# Patient Record
Sex: FEMALE | Race: WHITE | NOT HISPANIC OR LATINO | URBAN - METROPOLITAN AREA
[De-identification: names, ages, dates, MRNs, and addresses within clinical notes are randomized per-mention and may not be internally consistent; named-entity substitution may affect disease eponyms.]

---

## 2017-10-27 ENCOUNTER — OUTPATIENT (OUTPATIENT)
Dept: OUTPATIENT SERVICES | Facility: HOSPITAL | Age: 67
LOS: 1 days | End: 2017-10-27
Payer: MEDICARE

## 2017-10-27 VITALS
HEIGHT: 64 IN | DIASTOLIC BLOOD PRESSURE: 76 MMHG | HEART RATE: 96 BPM | TEMPERATURE: 98 F | RESPIRATION RATE: 14 BRPM | WEIGHT: 201.06 LBS | SYSTOLIC BLOOD PRESSURE: 135 MMHG

## 2017-10-27 DIAGNOSIS — Z90.89 ACQUIRED ABSENCE OF OTHER ORGANS: Chronic | ICD-10-CM

## 2017-10-27 DIAGNOSIS — Z41.9 ENCOUNTER FOR PROCEDURE FOR PURPOSES OTHER THAN REMEDYING HEALTH STATE, UNSPECIFIED: Chronic | ICD-10-CM

## 2017-10-27 DIAGNOSIS — N20.0 CALCULUS OF KIDNEY: ICD-10-CM

## 2017-10-27 DIAGNOSIS — Z90.2 ACQUIRED ABSENCE OF LUNG [PART OF]: Chronic | ICD-10-CM

## 2017-10-27 DIAGNOSIS — Z98.890 OTHER SPECIFIED POSTPROCEDURAL STATES: Chronic | ICD-10-CM

## 2017-10-27 DIAGNOSIS — Z01.818 ENCOUNTER FOR OTHER PREPROCEDURAL EXAMINATION: ICD-10-CM

## 2017-10-27 LAB
ALBUMIN SERPL ELPH-MCNC: 3.3 G/DL — SIGNIFICANT CHANGE UP (ref 3.3–5)
ALP SERPL-CCNC: 90 U/L — SIGNIFICANT CHANGE UP (ref 40–120)
ALT FLD-CCNC: 27 U/L — SIGNIFICANT CHANGE UP (ref 12–78)
ANION GAP SERPL CALC-SCNC: 9 MMOL/L — SIGNIFICANT CHANGE UP (ref 5–17)
APPEARANCE UR: ABNORMAL
APTT BLD: 32.8 SEC — SIGNIFICANT CHANGE UP (ref 27.5–37.4)
AST SERPL-CCNC: 22 U/L — SIGNIFICANT CHANGE UP (ref 15–37)
BILIRUB SERPL-MCNC: 0.4 MG/DL — SIGNIFICANT CHANGE UP (ref 0.2–1.2)
BILIRUB UR-MCNC: NEGATIVE — SIGNIFICANT CHANGE UP
BUN SERPL-MCNC: 11 MG/DL — SIGNIFICANT CHANGE UP (ref 7–23)
CALCIUM SERPL-MCNC: 9.7 MG/DL — SIGNIFICANT CHANGE UP (ref 8.5–10.1)
CHLORIDE SERPL-SCNC: 103 MMOL/L — SIGNIFICANT CHANGE UP (ref 96–108)
CO2 SERPL-SCNC: 27 MMOL/L — SIGNIFICANT CHANGE UP (ref 22–31)
COLOR SPEC: YELLOW — SIGNIFICANT CHANGE UP
CREAT SERPL-MCNC: 0.86 MG/DL — SIGNIFICANT CHANGE UP (ref 0.5–1.3)
DIFF PNL FLD: ABNORMAL
GLUCOSE SERPL-MCNC: 91 MG/DL — SIGNIFICANT CHANGE UP (ref 70–99)
GLUCOSE UR QL: NEGATIVE — SIGNIFICANT CHANGE UP
HCT VFR BLD CALC: 44.3 % — SIGNIFICANT CHANGE UP (ref 34.5–45)
HGB BLD-MCNC: 14.7 G/DL — SIGNIFICANT CHANGE UP (ref 11.5–15.5)
INR BLD: 1.13 RATIO — SIGNIFICANT CHANGE UP (ref 0.88–1.16)
KETONES UR-MCNC: NEGATIVE — SIGNIFICANT CHANGE UP
LEUKOCYTE ESTERASE UR-ACNC: ABNORMAL
MCHC RBC-ENTMCNC: 31.4 PG — SIGNIFICANT CHANGE UP (ref 27–34)
MCHC RBC-ENTMCNC: 33.2 GM/DL — SIGNIFICANT CHANGE UP (ref 32–36)
MCV RBC AUTO: 94.5 FL — SIGNIFICANT CHANGE UP (ref 80–100)
NITRITE UR-MCNC: NEGATIVE — SIGNIFICANT CHANGE UP
PH UR: 6.5 — SIGNIFICANT CHANGE UP (ref 5–8)
PLATELET # BLD AUTO: 306 K/UL — SIGNIFICANT CHANGE UP (ref 150–400)
POTASSIUM SERPL-MCNC: 4.2 MMOL/L — SIGNIFICANT CHANGE UP (ref 3.5–5.3)
POTASSIUM SERPL-SCNC: 4.2 MMOL/L — SIGNIFICANT CHANGE UP (ref 3.5–5.3)
PROT SERPL-MCNC: 7.8 G/DL — SIGNIFICANT CHANGE UP (ref 6–8.3)
PROT UR-MCNC: 150 MG/DL
PROTHROM AB SERPL-ACNC: 12.4 SEC — SIGNIFICANT CHANGE UP (ref 9.8–12.7)
RBC # BLD: 4.68 M/UL — SIGNIFICANT CHANGE UP (ref 3.8–5.2)
RBC # FLD: 13.5 % — SIGNIFICANT CHANGE UP (ref 10.3–14.5)
SODIUM SERPL-SCNC: 139 MMOL/L — SIGNIFICANT CHANGE UP (ref 135–145)
SP GR SPEC: 1.01 — SIGNIFICANT CHANGE UP (ref 1.01–1.02)
UROBILINOGEN FLD QL: NEGATIVE — SIGNIFICANT CHANGE UP
WBC # BLD: 6.3 K/UL — SIGNIFICANT CHANGE UP (ref 3.8–10.5)
WBC # FLD AUTO: 6.3 K/UL — SIGNIFICANT CHANGE UP (ref 3.8–10.5)

## 2017-10-27 PROCEDURE — 87086 URINE CULTURE/COLONY COUNT: CPT

## 2017-10-27 PROCEDURE — 85610 PROTHROMBIN TIME: CPT

## 2017-10-27 PROCEDURE — 93005 ELECTROCARDIOGRAM TRACING: CPT

## 2017-10-27 PROCEDURE — 71046 X-RAY EXAM CHEST 2 VIEWS: CPT

## 2017-10-27 PROCEDURE — 71020: CPT | Mod: 26

## 2017-10-27 PROCEDURE — 81001 URINALYSIS AUTO W/SCOPE: CPT

## 2017-10-27 PROCEDURE — 85027 COMPLETE CBC AUTOMATED: CPT

## 2017-10-27 PROCEDURE — 93010 ELECTROCARDIOGRAM REPORT: CPT | Mod: NC

## 2017-10-27 PROCEDURE — 85730 THROMBOPLASTIN TIME PARTIAL: CPT

## 2017-10-27 PROCEDURE — 80053 COMPREHEN METABOLIC PANEL: CPT

## 2017-10-27 PROCEDURE — G0463: CPT

## 2017-10-27 NOTE — H&P PST ADULT - PROBLEM SELECTOR PLAN 1
PST labs; CBC, CMP, U/A, Urine Culture, EKG - Medical Clearance on Chart - pt has already stopped Baby ASA - instructed pt to stop supplements - pre-op instructions given to pt with understanding verbalized

## 2017-10-27 NOTE — H&P PST ADULT - FAMILY HISTORY
Mother  Still living? No  Family history of coronary artery disease in mother, Age at diagnosis: Age Unknown     Father  Still living? No  Family history of lung cancer, Age at diagnosis: Age Unknown

## 2017-10-27 NOTE — H&P PST ADULT - SKIN/BREAST COMMENTS
Adequate: hears normal conversation without difficulty Erythema noted to LEFT Ante-Cub secondary to reaction from Linezolid

## 2017-10-27 NOTE — H&P PST ADULT - HISTORY OF PRESENT ILLNESS
67 year old female presents for PST prior to LEFT ESWL with Dr Lisandro Hair on 11/1/17 - pt notes she was recently hospitalized in [facility] - notes she became weak Nikos day weekend fell twice and her  took her to [facility] and testing revealed Kidney stone as well as Urosepsis - pt was in hosp for 5 days - received IV ABX then was discharged home on oral Linezolid which she notes caused Erythema rash to left ante-Cubital area - pt has been seeing Dr Hair as an outpatient - following discussions to do ESWL - pt is electing for scheduled procedure.

## 2017-10-27 NOTE — H&P PST ADULT - PSH
Elective surgery  Removal of Atypical Ductal Hyperplasia - 2006 - RIGHT Breast  H/O colonoscopy    H/O lithotripsy    S/P lobectomy of lung  RUL Removed 2010 - did not need Chemotherapy or Radiation  S/P tonsillectomy

## 2017-10-27 NOTE — H&P PST ADULT - PMH
Atypical ductal hyperplasia of right breast  2006  Calculus of kidney    Heart attack  "MILD HEART ATTACK' in 2006 - Known to Dr Siva Watkins- J[place]  Herniated lumbar intervertebral disc  L4-L5  Malignant neoplasm of upper lobe of right lung  Detected with Breast Sonogram done with yearly Mammogram  Shortness of breath on exertion    Torn meniscus  LEFT Knee

## 2017-10-27 NOTE — H&P PST ADULT - NSANTHOSAYNRD_GEN_A_CORE
No. MEET screening performed.  STOP BANG Legend: 0-2 = LOW Risk; 3-4 = INTERMEDIATE Risk; 5-8 = HIGH Risk

## 2017-10-27 NOTE — H&P PST ADULT - ASSESSMENT
67 year old female with Calculus of Kidney - scheduled for LEFT ESWL with Dr Lisandro Hair on 11/1/17 -

## 2017-10-28 LAB
CULTURE RESULTS: SIGNIFICANT CHANGE UP
SPECIMEN SOURCE: SIGNIFICANT CHANGE UP

## 2017-10-31 RX ORDER — SODIUM CHLORIDE 9 MG/ML
1000 INJECTION, SOLUTION INTRAVENOUS
Qty: 0 | Refills: 0 | Status: DISCONTINUED | OUTPATIENT
Start: 2017-11-21 | End: 2017-11-21

## 2017-11-20 ENCOUNTER — TRANSCRIPTION ENCOUNTER (OUTPATIENT)
Age: 67
End: 2017-11-20

## 2017-11-21 ENCOUNTER — OUTPATIENT (OUTPATIENT)
Dept: OUTPATIENT SERVICES | Facility: HOSPITAL | Age: 67
LOS: 1 days | End: 2017-11-21
Payer: MEDICARE

## 2017-11-21 VITALS
OXYGEN SATURATION: 95 % | HEIGHT: 64 IN | SYSTOLIC BLOOD PRESSURE: 146 MMHG | DIASTOLIC BLOOD PRESSURE: 98 MMHG | HEART RATE: 81 BPM | WEIGHT: 199.96 LBS | RESPIRATION RATE: 17 BRPM | TEMPERATURE: 99 F

## 2017-11-21 VITALS
SYSTOLIC BLOOD PRESSURE: 124 MMHG | DIASTOLIC BLOOD PRESSURE: 66 MMHG | HEART RATE: 73 BPM | OXYGEN SATURATION: 96 % | RESPIRATION RATE: 15 BRPM

## 2017-11-21 DIAGNOSIS — Z98.890 OTHER SPECIFIED POSTPROCEDURAL STATES: Chronic | ICD-10-CM

## 2017-11-21 DIAGNOSIS — Z01.818 ENCOUNTER FOR OTHER PREPROCEDURAL EXAMINATION: ICD-10-CM

## 2017-11-21 DIAGNOSIS — Z41.9 ENCOUNTER FOR PROCEDURE FOR PURPOSES OTHER THAN REMEDYING HEALTH STATE, UNSPECIFIED: Chronic | ICD-10-CM

## 2017-11-21 DIAGNOSIS — Z90.89 ACQUIRED ABSENCE OF OTHER ORGANS: Chronic | ICD-10-CM

## 2017-11-21 DIAGNOSIS — N20.0 CALCULUS OF KIDNEY: ICD-10-CM

## 2017-11-21 DIAGNOSIS — Z90.2 ACQUIRED ABSENCE OF LUNG [PART OF]: Chronic | ICD-10-CM

## 2017-11-21 PROCEDURE — 50590 FRAGMENTING OF KIDNEY STONE: CPT | Mod: LT

## 2017-11-21 RX ORDER — HYDROMORPHONE HYDROCHLORIDE 2 MG/ML
0.5 INJECTION INTRAMUSCULAR; INTRAVENOUS; SUBCUTANEOUS
Qty: 0 | Refills: 0 | Status: DISCONTINUED | OUTPATIENT
Start: 2017-11-21 | End: 2017-11-21

## 2017-11-21 RX ORDER — SODIUM CHLORIDE 9 MG/ML
1000 INJECTION, SOLUTION INTRAVENOUS
Qty: 0 | Refills: 0 | Status: DISCONTINUED | OUTPATIENT
Start: 2017-11-21 | End: 2017-11-21

## 2017-11-21 RX ADMIN — SODIUM CHLORIDE 75 MILLILITER(S): 9 INJECTION, SOLUTION INTRAVENOUS at 10:00

## 2017-11-21 NOTE — ASU DISCHARGE PLAN (ADULT/PEDIATRIC). - MEDICATION SUMMARY - MEDICATIONS TO TAKE
I will START or STAY ON the medications listed below when I get home from the hospital:    aspirin 81 mg oral tablet  -- 1 tab(s) by mouth once a day (at bedtime)  -- Indication: For per PCP    metoprolol succinate 25 mg oral tablet, extended release  -- 1 tab(s) by mouth once a day (at bedtime)  -- Indication: For per PCP    CoQ10 300 mg oral capsule  -- 1 cap(s) by mouth once a day - IN AM  -- Indication: For per PCP    Calcium 600+D 600 mg-200 intl units oral tablet  -- 1 tab(s) by mouth once a day - IN AM  -- Indication: For per PCP    Multiple Vitamins oral tablet  -- 1 tab(s) by mouth once a day - IN AM  -- Indication: For per PCP    Vitamin D3 1000 intl units oral capsule  -- 1 cap(s) by mouth once a day - IN AM  -- Indication: For per PCP

## 2017-12-21 ENCOUNTER — OUTPATIENT (OUTPATIENT)
Dept: OUTPATIENT SERVICES | Facility: HOSPITAL | Age: 67
LOS: 1 days | End: 2017-12-21
Payer: MEDICARE

## 2017-12-21 VITALS
RESPIRATION RATE: 18 BRPM | DIASTOLIC BLOOD PRESSURE: 86 MMHG | SYSTOLIC BLOOD PRESSURE: 150 MMHG | HEART RATE: 70 BPM | TEMPERATURE: 98 F | WEIGHT: 203.05 LBS | HEIGHT: 64 IN

## 2017-12-21 DIAGNOSIS — Z41.9 ENCOUNTER FOR PROCEDURE FOR PURPOSES OTHER THAN REMEDYING HEALTH STATE, UNSPECIFIED: Chronic | ICD-10-CM

## 2017-12-21 DIAGNOSIS — Z01.818 ENCOUNTER FOR OTHER PREPROCEDURAL EXAMINATION: ICD-10-CM

## 2017-12-21 DIAGNOSIS — N20.1 CALCULUS OF URETER: ICD-10-CM

## 2017-12-21 DIAGNOSIS — Z98.890 OTHER SPECIFIED POSTPROCEDURAL STATES: Chronic | ICD-10-CM

## 2017-12-21 DIAGNOSIS — Z90.89 ACQUIRED ABSENCE OF OTHER ORGANS: Chronic | ICD-10-CM

## 2017-12-21 DIAGNOSIS — Z90.2 ACQUIRED ABSENCE OF LUNG [PART OF]: Chronic | ICD-10-CM

## 2017-12-21 LAB
ALBUMIN SERPL ELPH-MCNC: 3.6 G/DL — SIGNIFICANT CHANGE UP (ref 3.3–5)
ALP SERPL-CCNC: 102 U/L — SIGNIFICANT CHANGE UP (ref 40–120)
ALT FLD-CCNC: 25 U/L — SIGNIFICANT CHANGE UP (ref 12–78)
ANION GAP SERPL CALC-SCNC: 6 MMOL/L — SIGNIFICANT CHANGE UP (ref 5–17)
APPEARANCE UR: ABNORMAL
APTT BLD: 31.3 SEC — SIGNIFICANT CHANGE UP (ref 27.5–37.4)
AST SERPL-CCNC: 20 U/L — SIGNIFICANT CHANGE UP (ref 15–37)
BASOPHILS # BLD AUTO: 0.1 K/UL — SIGNIFICANT CHANGE UP (ref 0–0.2)
BASOPHILS NFR BLD AUTO: 0.7 % — SIGNIFICANT CHANGE UP (ref 0–2)
BILIRUB SERPL-MCNC: 0.3 MG/DL — SIGNIFICANT CHANGE UP (ref 0.2–1.2)
BILIRUB UR-MCNC: NEGATIVE — SIGNIFICANT CHANGE UP
BUN SERPL-MCNC: 23 MG/DL — SIGNIFICANT CHANGE UP (ref 7–23)
CALCIUM SERPL-MCNC: 9.7 MG/DL — SIGNIFICANT CHANGE UP (ref 8.5–10.1)
CHLORIDE SERPL-SCNC: 103 MMOL/L — SIGNIFICANT CHANGE UP (ref 96–108)
CO2 SERPL-SCNC: 32 MMOL/L — HIGH (ref 22–31)
COLOR SPEC: YELLOW — SIGNIFICANT CHANGE UP
CREAT SERPL-MCNC: 0.8 MG/DL — SIGNIFICANT CHANGE UP (ref 0.5–1.3)
DIFF PNL FLD: ABNORMAL
EOSINOPHIL # BLD AUTO: 0.3 K/UL — SIGNIFICANT CHANGE UP (ref 0–0.5)
EOSINOPHIL NFR BLD AUTO: 2.2 % — SIGNIFICANT CHANGE UP (ref 0–6)
GLUCOSE SERPL-MCNC: 112 MG/DL — HIGH (ref 70–99)
GLUCOSE UR QL: NEGATIVE — SIGNIFICANT CHANGE UP
HCT VFR BLD CALC: 48.7 % — HIGH (ref 34.5–45)
HGB BLD-MCNC: 15.4 G/DL — SIGNIFICANT CHANGE UP (ref 11.5–15.5)
INR BLD: 1.02 RATIO — SIGNIFICANT CHANGE UP (ref 0.88–1.16)
KETONES UR-MCNC: NEGATIVE — SIGNIFICANT CHANGE UP
LEUKOCYTE ESTERASE UR-ACNC: ABNORMAL
LYMPHOCYTES # BLD AUTO: 2.6 K/UL — SIGNIFICANT CHANGE UP (ref 1–3.3)
LYMPHOCYTES # BLD AUTO: 20.6 % — SIGNIFICANT CHANGE UP (ref 13–44)
MCHC RBC-ENTMCNC: 29.6 PG — SIGNIFICANT CHANGE UP (ref 27–34)
MCHC RBC-ENTMCNC: 31.7 GM/DL — LOW (ref 32–36)
MCV RBC AUTO: 93.5 FL — SIGNIFICANT CHANGE UP (ref 80–100)
MONOCYTES # BLD AUTO: 0.5 K/UL — SIGNIFICANT CHANGE UP (ref 0–0.9)
MONOCYTES NFR BLD AUTO: 4.2 % — SIGNIFICANT CHANGE UP (ref 1–9)
NEUTROPHILS # BLD AUTO: 9.2 K/UL — HIGH (ref 1.8–7.4)
NEUTROPHILS NFR BLD AUTO: 72.3 % — SIGNIFICANT CHANGE UP (ref 43–77)
NITRITE UR-MCNC: NEGATIVE — SIGNIFICANT CHANGE UP
PH UR: 5 — SIGNIFICANT CHANGE UP (ref 5–8)
PLATELET # BLD AUTO: 268 K/UL — SIGNIFICANT CHANGE UP (ref 150–400)
POTASSIUM SERPL-MCNC: 4.2 MMOL/L — SIGNIFICANT CHANGE UP (ref 3.5–5.3)
POTASSIUM SERPL-SCNC: 4.2 MMOL/L — SIGNIFICANT CHANGE UP (ref 3.5–5.3)
PROT SERPL-MCNC: 7.9 G/DL — SIGNIFICANT CHANGE UP (ref 6–8.3)
PROT UR-MCNC: 75 MG/DL
PROTHROM AB SERPL-ACNC: 11.1 SEC — SIGNIFICANT CHANGE UP (ref 9.8–12.7)
RBC # BLD: 5.21 M/UL — HIGH (ref 3.8–5.2)
RBC # FLD: 12.9 % — SIGNIFICANT CHANGE UP (ref 10.3–14.5)
SODIUM SERPL-SCNC: 141 MMOL/L — SIGNIFICANT CHANGE UP (ref 135–145)
SP GR SPEC: 1.02 — SIGNIFICANT CHANGE UP (ref 1.01–1.02)
UROBILINOGEN FLD QL: NEGATIVE — SIGNIFICANT CHANGE UP
WBC # BLD: 12.7 K/UL — HIGH (ref 3.8–10.5)
WBC # FLD AUTO: 12.7 K/UL — HIGH (ref 3.8–10.5)

## 2017-12-21 PROCEDURE — 85610 PROTHROMBIN TIME: CPT

## 2017-12-21 PROCEDURE — 87186 SC STD MICRODIL/AGAR DIL: CPT

## 2017-12-21 PROCEDURE — 85027 COMPLETE CBC AUTOMATED: CPT

## 2017-12-21 PROCEDURE — 85730 THROMBOPLASTIN TIME PARTIAL: CPT

## 2017-12-21 PROCEDURE — G0463: CPT

## 2017-12-21 PROCEDURE — 81001 URINALYSIS AUTO W/SCOPE: CPT

## 2017-12-21 PROCEDURE — 87086 URINE CULTURE/COLONY COUNT: CPT

## 2017-12-21 PROCEDURE — 80053 COMPREHEN METABOLIC PANEL: CPT

## 2017-12-21 NOTE — H&P PST ADULT - PROBLEM SELECTOR PLAN 1
Left ESWL  Labs done.   Medical Clearance with Dr. cody.  Dajuan lucia explained and provided. Verbalized understanding.   Instructions given Left ESWL scheduled  Labs done.   Medical Clearance with Dr. Grant advised.   Instructions given. Verbalized understanding. Left ESWL scheduled  Labs done. Instructed on stopping all vitamins and ASA 7 days prior to surgery.  Medical Clearance with Dr. Grant advised.   Instructions given. Verbalized understanding.

## 2017-12-21 NOTE — H&P PST ADULT - PMH
Atypical ductal hyperplasia of right breast  2006  Calculus of kidney    Calculus of ureter    Heart attack  "MILD HEART ATTACK' in 2006 - Known to Dr Siva Watkins- [place]  Herniated lumbar intervertebral disc  L4-L5  Malignant neoplasm of upper lobe of right lung  Detected with Breast Sonogram done with yearly Mammogram  Shortness of breath on exertion    Torn meniscus  LEFT Knee Atypical ductal hyperplasia of right breast  2006  Calculus of kidney    Calculus of ureter    Heart attack  "MILD HEART ATTACK' in 2006 - Known to Dr Siva Watkins- Jalen  Herniated lumbar intervertebral disc  L4-L5  Hydronephrosis, unspecified hydronephrosis type    Malignant neoplasm of upper lobe of right lung  Detected with Breast Sonogram done with yearly Mammogram  Shortness of breath on exertion    Torn meniscus  LEFT Knee  Urinary tract infection without hematuria, site unspecified

## 2017-12-21 NOTE — H&P PST ADULT - HISTORY OF PRESENT ILLNESS
This is a 67 year old female with PMH Calculus of ureter ,UTI and hydronephrosis of who presents today for pre-surgical testing for Lithotripsy

## 2017-12-23 LAB
-  AMPICILLIN: SIGNIFICANT CHANGE UP
-  CIPROFLOXACIN: SIGNIFICANT CHANGE UP
-  NITROFURANTOIN: SIGNIFICANT CHANGE UP
-  TETRACYCLINE: SIGNIFICANT CHANGE UP
-  VANCOMYCIN: SIGNIFICANT CHANGE UP
CULTURE RESULTS: SIGNIFICANT CHANGE UP
METHOD TYPE: SIGNIFICANT CHANGE UP
ORGANISM # SPEC MICROSCOPIC CNT: SIGNIFICANT CHANGE UP
ORGANISM # SPEC MICROSCOPIC CNT: SIGNIFICANT CHANGE UP
SPECIMEN SOURCE: SIGNIFICANT CHANGE UP

## 2017-12-30 ENCOUNTER — OUTPATIENT (OUTPATIENT)
Dept: OUTPATIENT SERVICES | Facility: HOSPITAL | Age: 67
LOS: 1 days | End: 2017-12-30
Payer: MEDICARE

## 2017-12-30 DIAGNOSIS — Z90.2 ACQUIRED ABSENCE OF LUNG [PART OF]: Chronic | ICD-10-CM

## 2017-12-30 DIAGNOSIS — Z98.890 OTHER SPECIFIED POSTPROCEDURAL STATES: Chronic | ICD-10-CM

## 2017-12-30 DIAGNOSIS — Z41.9 ENCOUNTER FOR PROCEDURE FOR PURPOSES OTHER THAN REMEDYING HEALTH STATE, UNSPECIFIED: Chronic | ICD-10-CM

## 2017-12-30 DIAGNOSIS — N39.0 URINARY TRACT INFECTION, SITE NOT SPECIFIED: ICD-10-CM

## 2017-12-30 DIAGNOSIS — Z90.89 ACQUIRED ABSENCE OF OTHER ORGANS: Chronic | ICD-10-CM

## 2017-12-30 PROCEDURE — 87086 URINE CULTURE/COLONY COUNT: CPT

## 2018-01-02 ENCOUNTER — TRANSCRIPTION ENCOUNTER (OUTPATIENT)
Age: 68
End: 2018-01-02

## 2018-01-02 RX ORDER — SODIUM CHLORIDE 9 MG/ML
1000 INJECTION, SOLUTION INTRAVENOUS
Qty: 0 | Refills: 0 | Status: DISCONTINUED | OUTPATIENT
Start: 2018-01-03 | End: 2018-01-03

## 2018-01-03 ENCOUNTER — OUTPATIENT (OUTPATIENT)
Dept: OUTPATIENT SERVICES | Facility: HOSPITAL | Age: 68
LOS: 1 days | End: 2018-01-03
Payer: MEDICARE

## 2018-01-03 VITALS
RESPIRATION RATE: 16 BRPM | HEART RATE: 77 BPM | TEMPERATURE: 98 F | SYSTOLIC BLOOD PRESSURE: 136 MMHG | DIASTOLIC BLOOD PRESSURE: 77 MMHG | HEIGHT: 64 IN | WEIGHT: 203.05 LBS | OXYGEN SATURATION: 95 %

## 2018-01-03 VITALS — TEMPERATURE: 98 F

## 2018-01-03 DIAGNOSIS — Z98.890 OTHER SPECIFIED POSTPROCEDURAL STATES: Chronic | ICD-10-CM

## 2018-01-03 DIAGNOSIS — N20.1 CALCULUS OF URETER: ICD-10-CM

## 2018-01-03 DIAGNOSIS — Z90.2 ACQUIRED ABSENCE OF LUNG [PART OF]: Chronic | ICD-10-CM

## 2018-01-03 DIAGNOSIS — Z41.9 ENCOUNTER FOR PROCEDURE FOR PURPOSES OTHER THAN REMEDYING HEALTH STATE, UNSPECIFIED: Chronic | ICD-10-CM

## 2018-01-03 DIAGNOSIS — Z90.89 ACQUIRED ABSENCE OF OTHER ORGANS: Chronic | ICD-10-CM

## 2018-01-03 PROCEDURE — 50590 FRAGMENTING OF KIDNEY STONE: CPT | Mod: LT

## 2018-01-03 RX ORDER — GENTAMICIN SULFATE 40 MG/ML
120 VIAL (ML) INJECTION ONCE
Qty: 0 | Refills: 0 | Status: DISCONTINUED | OUTPATIENT
Start: 2018-01-03 | End: 2018-01-03

## 2018-01-03 RX ORDER — ASPIRIN/CALCIUM CARB/MAGNESIUM 324 MG
1 TABLET ORAL
Qty: 0 | Refills: 0 | COMMUNITY

## 2018-01-03 RX ORDER — OXYCODONE HYDROCHLORIDE 5 MG/1
5 TABLET ORAL EVERY 4 HOURS
Qty: 0 | Refills: 0 | Status: DISCONTINUED | OUTPATIENT
Start: 2018-01-03 | End: 2018-01-04

## 2018-01-03 RX ORDER — CHOLECALCIFEROL (VITAMIN D3) 125 MCG
1 CAPSULE ORAL
Qty: 0 | Refills: 0 | COMMUNITY

## 2018-01-03 RX ORDER — SODIUM CHLORIDE 9 MG/ML
1000 INJECTION, SOLUTION INTRAVENOUS
Qty: 0 | Refills: 0 | Status: DISCONTINUED | OUTPATIENT
Start: 2018-01-03 | End: 2018-01-04

## 2018-01-03 RX ORDER — SODIUM CHLORIDE 9 MG/ML
1000 INJECTION, SOLUTION INTRAVENOUS
Qty: 0 | Refills: 0 | Status: DISCONTINUED | OUTPATIENT
Start: 2018-01-03 | End: 2018-01-03

## 2018-01-03 RX ORDER — ONDANSETRON 8 MG/1
4 TABLET, FILM COATED ORAL ONCE
Qty: 0 | Refills: 0 | Status: DISCONTINUED | OUTPATIENT
Start: 2018-01-03 | End: 2018-01-04

## 2018-01-03 RX ORDER — UBIDECARENONE 100 MG
1 CAPSULE ORAL
Qty: 0 | Refills: 0 | COMMUNITY

## 2018-01-03 RX ORDER — NITROFURANTOIN MACROCRYSTAL 50 MG
1 CAPSULE ORAL
Qty: 0 | Refills: 0 | COMMUNITY

## 2018-01-03 RX ORDER — METOPROLOL TARTRATE 50 MG
1 TABLET ORAL
Qty: 0 | Refills: 0 | COMMUNITY

## 2018-01-03 RX ORDER — HYDROMORPHONE HYDROCHLORIDE 2 MG/ML
0.5 INJECTION INTRAMUSCULAR; INTRAVENOUS; SUBCUTANEOUS
Qty: 0 | Refills: 0 | Status: DISCONTINUED | OUTPATIENT
Start: 2018-01-03 | End: 2018-01-04

## 2018-01-03 RX ADMIN — SODIUM CHLORIDE 75 MILLILITER(S): 9 INJECTION, SOLUTION INTRAVENOUS at 09:48

## 2018-01-03 NOTE — ASU DISCHARGE PLAN (ADULT/PEDIATRIC). - MEDICATION SUMMARY - MEDICATIONS TO TAKE
I will START or STAY ON the medications listed below when I get home from the hospital:    aspirin 81 mg oral tablet  -- 1 tab(s) by mouth once a day (at bedtime)  -- Indication: For per PCP    metoprolol succinate 25 mg oral tablet, extended release  -- 1 tab(s) by mouth once a day (at bedtime)  -- Indication: For per PCP    CoQ10 300 mg oral capsule  -- 1 cap(s) by mouth once a day - IN AM  -- Indication: For per PCP    Macrobid 100 mg oral capsule  -- 1 cap(s) by mouth 2 times a day  -- Indication: For per PCP    Calcium 600+D 600 mg-200 intl units oral tablet  -- 1 tab(s) by mouth once a day - IN AM  -- Indication: For per PCP    Multiple Vitamins oral tablet  -- 1 tab(s) by mouth once a day - IN AM  -- Indication: For per PCP    Vitamin D3 1000 intl units oral capsule  -- 1 cap(s) by mouth once a day - IN AM  -- Indication: For per PCP

## 2018-01-03 NOTE — ASU PATIENT PROFILE, ADULT - PMH
Atypical ductal hyperplasia of right breast  2006  Calculus of kidney    Calculus of ureter    Heart attack  "MILD HEART ATTACK' in 2006 - Known to Dr Siva Watkins- [place]  Herniated lumbar intervertebral disc  L4-L5  Hydronephrosis, unspecified hydronephrosis type    Malignant neoplasm of upper lobe of right lung  Detected with Breast Sonogram done with yearly Mammogram  Shortness of breath on exertion    Torn meniscus  LEFT Knee  Urinary tract infection without hematuria, site unspecified

## 2018-01-03 NOTE — BRIEF OPERATIVE NOTE - PROCEDURE
<<-----Click on this checkbox to enter Procedure ESWL kid/ureter/bladder  01/03/2018    Active  [id number]

## 2018-07-16 PROBLEM — C34.11 MALIGNANT NEOPLASM OF UPPER LOBE, RIGHT BRONCHUS OR LUNG: Chronic | Status: ACTIVE | Noted: 2017-10-27

## 2018-07-16 PROBLEM — M51.26 OTHER INTERVERTEBRAL DISC DISPLACEMENT, LUMBAR REGION: Chronic | Status: ACTIVE | Noted: 2017-10-27

## 2018-07-16 PROBLEM — I21.9 ACUTE MYOCARDIAL INFARCTION, UNSPECIFIED: Chronic | Status: ACTIVE | Noted: 2017-10-27

## 2019-02-14 NOTE — ASU PATIENT PROFILE, ADULT - NSALCOHOLTYPE_GEN__A_CORE_SD
Telephone Encounter by Elle Valladares CMA at 11/15/18 10:43 AM     Author:  Elle Valladares CMA Service:  (none) Author Type:  Certified Medical Assistant     Filed:  11/15/18 10:44 AM Encounter Date:  11/13/2018 Status:  Signed     :  Elle Valladares CMA (Certified Medical Assistant)            LV for physical 6/15/18.  To Dr. Dozier, please advise.[AH1.1M]      Revision History        User Key Date/Time User Provider Type Action    > AH1.1 11/15/18 10:44 AM Elle Valladares CMA Certified Medical Assistant Sign    M - Manual             wine

## 2019-02-15 NOTE — H&P PST ADULT - VENOUS THROMBOEMBOLISM HISTORY
EXAMINATION: Portable chest radiograph.

 

HISTORY: Shortness of breath.

 

FINDINGS: 

The trachea is midline. The cardiomediastinal silhouette is within normal

limits. No pulmonary infiltrates, effusions or pneumothorax.

 

Osseous structures appear unremarkable.

 

IMPRESSION: 

No acute cardiopulmonary process. prior major surgery

## 2020-10-08 NOTE — ASU PREOP CHECKLIST - SKIN PREP
[facility] ED  EMERGENCY DEPARTMENT ENCOUNTER      Pt Name: Benito Kehr  MRN: [id number]  [place] 1981  Date of evaluation: 10/8/2020  Provider: Pepe Parry PA-C      HISTORY OF PRESENT ILLNESS    Benito Kehr is a 44 y.o. female who presents to the Emergency Department with dog bite to left inner thigh. This happened prior to arrival by her friend's dog accidentally. Dog is up-to-date on vaccinations including rabies per the patient. Patient's tetanus is within the last 5 years as well per the patient. She is not on any anticoagulation. Bleeding is controlled. Pain is localized and moderate and constant to the area. REVIEW OF SYSTEMS       Review of Systems   Constitutional: Negative for chills, diaphoresis, fatigue and fever. HENT: Negative for congestion, rhinorrhea and sore throat. Eyes: Negative for photophobia and pain. Respiratory: Negative for cough and shortness of breath. Cardiovascular: Negative for chest pain and palpitations. Gastrointestinal: Negative for abdominal pain, diarrhea, nausea and vomiting. Genitourinary: Negative for dysuria and flank pain. Musculoskeletal: Negative for back pain. Skin: Positive for wound. Negative for rash. Neurological: Negative for dizziness, light-headedness and headaches. Psychiatric/Behavioral: Negative. All other systems reviewed and are negative.         PAST MEDICAL HISTORY     Past Medical History:   Diagnosis Date    Abnormal Pap smear of cervix     Breast disorder     Fibroid          SURGICAL HISTORY       Past Surgical History:   Procedure Laterality Date    FOOT SURGERY      cyst on right foot as a child    UTERINE FIBROID EMBOLIZATION  03/11/2020    Dr Peck [facility] [phone]         CURRENT MEDICATIONS       Discharge Medication List as of 10/8/2020 12:39 PM      CONTINUE these medications which have NOT CHANGED    Details   polyethylene glycol (MIRALAX) 17 GM/SCOOP powder Use ONE CAPEFUL at 10 am and then 2 pm on day prior to procedure, Disp-1020 g,R-0Normal      clindamycin (CLEOCIN) 2 % vaginal cream USE one applicatorful vaginally nightly daily for 1 week prior to procedure, Disp-1 Tube,R-0Normal      !! ibuprofen (ADVIL;MOTRIN) 600 MG tablet Take 1 tablet by mouth every 6 hours as needed for Pain, Disp-120 tablet, R-3Normal      amLODIPine (NORVASC) 5 MG tablet Take 5 mg by mouth dailyHistorical Med      omeprazole (PRILOSEC) 20 MG delayed release capsule Take 20 mg by mouthHistorical Med      ondansetron (ZOFRAN ODT) 4 MG disintegrating tablet Take 1 tablet by mouth every 8 hours as needed for Nausea, Disp-20 tablet, R-0Print       !! - Potential duplicate medications found. Please discuss with provider. ALLERGIES     Metronidazole and Percocet [oxycodone-acetaminophen]    FAMILY HISTORY       Family History   Problem Relation Age of Onset    Diabetes Paternal Grandmother     Diabetes Father     Diabetes Paternal Aunt     Diabetes Paternal Uncle     Breast Cancer Neg Hx     Cancer Neg Hx     Colon Cancer Neg Hx     Eclampsia Neg Hx     Ovarian Cancer Neg Hx     Hypertension Neg Hx      Labor Neg Hx     Spont Abortions Neg Hx     Stroke Neg Hx           SOCIAL HISTORY       Social History     Socioeconomic History    Marital status: Single     Spouse name: None    Number of children: None    Years of education: None    Highest education level: None   Occupational History    None   Social Needs    Financial resource strain: None    Food insecurity     Worry: None     Inability: None    Transportation needs     Medical: None     Non-medical: None   Tobacco Use    Smoking status: Former Smoker    Smokeless tobacco: Former User   Substance and Sexual Activity    Alcohol use: Yes     Comment:  On \"occasion liquor\"    Drug use: No    Sexual activity: Yes     Partners: Male     Comment: Condoms   Lifestyle    Physical activity     Days per week: None Minutes per session: None    Stress: None   Relationships    Social connections     Talks on phone: None     Gets together: None     Attends [employer] witness service: None     Active member of club or organization: None     Attends meetings of clubs or organizations: None     Relationship status: None    Intimate partner violence     Fear of current or ex partner: None     Emotionally abused: None     Physically abused: None     Forced sexual activity: None   Other Topics Concern    None   Social History Narrative    None       SCREENINGS             PHYSICAL EXAM    (up to 7 for level 4, 8 or more for level 5)     ED Triage Vitals   BP Temp Temp Source Pulse Resp SpO2 Height Weight   10/08/20 1136 10/08/20 1136 10/08/20 1136 10/08/20 1136 10/08/20 1136 10/08/20 1136 10/08/20 1136 10/08/20 1137   (!) 139/99 97.1 °F (36.2 °C) Temporal 88 19 97 % 5' (1.524 m) 210 lb (95.3 kg)       Physical Exam  Vitals signs and nursing note reviewed. Constitutional:       General: She is not in acute distress. Appearance: Normal appearance. She is well-developed. She is not diaphoretic. HENT:      Head: Normocephalic and atraumatic. Eyes:      General: Lids are normal.      Conjunctiva/sclera: Conjunctivae normal.   Neck:      Musculoskeletal: Normal range of motion and neck supple. Cardiovascular:      Rate and Rhythm: Normal rate and regular rhythm. Pulses: Normal pulses. Heart sounds: Normal heart sounds. Pulmonary:      Effort: Pulmonary effort is normal.      Breath sounds: Normal breath sounds. Abdominal:      General: Bowel sounds are normal.      Palpations: Abdomen is soft. Tenderness: There is no abdominal tenderness. Lymphadenopathy:      Cervical: No cervical adenopathy. Skin:     General: Skin is warm and dry. Capillary Refill: Capillary refill takes less than 2 seconds. Findings: Laceration and wound present. No rash.           Neurological:      Mental Status: She is alert details:     Preparation:  Patient was prepped and draped in usual sterile fashion  Exploration:     Hemostasis achieved with:  Direct pressure    Wound exploration: wound explored through full range of motion and entire depth of wound probed and visualized      Wound extent: no areolar tissue violation noted, no fascia violation noted, no foreign bodies/material noted, no nerve damage noted, no tendon damage noted, no underlying fracture noted and no vascular damage noted      Contaminated: yes    Treatment:     Area cleansed with:  Betadine and saline    Amount of cleaning:  Extensive    Irrigation solution:  Sterile saline    Irrigation volume:  500ml    Irrigation method:  Pressure wash    Visualized foreign bodies/material removed: no    Skin repair:     Repair method:  Sutures    Suture size:  4-0    Suture material:  Nylon    Suture technique:  Simple interrupted    Number of sutures:  5  Approximation:     Approximation:  Loose  Post-procedure details:     Dressing:  Non-adherent dressing and antibiotic ointment          FINAL IMPRESSION      1.  Dog bite of left thigh, initial encounter          DISPOSITION/PLAN   DISPOSITION Decision To Discharge 10/08/2020 12:38:11 PM          Noah Esposito PA-C (electronically signed)  Attending Emergency Physician        Noah Esposito PA-C  10/08/20 3024 n/a

## 2021-02-27 PROBLEM — S83.209A UNSPECIFIED TEAR OF UNSPECIFIED MENISCUS, CURRENT INJURY, UNSPECIFIED KNEE, INITIAL ENCOUNTER: Chronic | Status: ACTIVE | Noted: 2017-10-27

## 2021-02-27 PROBLEM — N39.0 URINARY TRACT INFECTION, SITE NOT SPECIFIED: Chronic | Status: ACTIVE | Noted: 2017-12-21

## 2021-02-27 PROBLEM — N13.30 UNSPECIFIED HYDRONEPHROSIS: Chronic | Status: ACTIVE | Noted: 2017-12-21

## 2021-02-27 PROBLEM — N60.91 UNSPECIFIED BENIGN MAMMARY DYSPLASIA OF RIGHT BREAST: Chronic | Status: ACTIVE | Noted: 2017-10-27

## 2021-02-27 PROBLEM — R06.02 SHORTNESS OF BREATH: Chronic | Status: ACTIVE | Noted: 2017-10-27

## 2021-02-27 PROBLEM — N20.0 CALCULUS OF KIDNEY: Chronic | Status: ACTIVE | Noted: 2017-10-27

## 2021-02-27 PROBLEM — N20.1 CALCULUS OF URETER: Chronic | Status: ACTIVE | Noted: 2017-12-21

## 2021-03-03 DIAGNOSIS — Z01.818 ENCOUNTER FOR OTHER PREPROCEDURAL EXAMINATION: ICD-10-CM

## 2021-03-03 PROBLEM — Z00.00 ENCOUNTER FOR PREVENTIVE HEALTH EXAMINATION: Status: ACTIVE | Noted: 2021-03-03

## 2021-03-04 ENCOUNTER — APPOINTMENT (OUTPATIENT)
Dept: DISASTER EMERGENCY | Facility: CLINIC | Age: 71
End: 2021-03-04

## 2021-03-05 LAB — SARS-COV-2 N GENE NPH QL NAA+PROBE: NOT DETECTED

## 2021-12-16 NOTE — H&P PST ADULT - VENOUS THROMBOEMBOLISM FOR WOMEN ONLY
Patient will obtain her images then Dr. Tae Salas will see her back in the office.       Thanks, Marina Worley none

## 2022-11-01 NOTE — H&P PST ADULT - RS GEN PE MLT RESP DETAILS PC
respirations non-labored/no chest wall tenderness/normal/breath sounds equal/airway patent/good air movement/clear to auscultation bilaterally

## 2023-02-06 NOTE — H&P PST ADULT - MAMMOGRAM, RESULTS OF LAST, PROFILE
75M with PMHx of IVDU found unresponsive at his nursing home, resolved after Narcan in the field and brought to [facility]. Found to have PE, atrial flutter, and large LV thrombus on echo. Transferred to [facility] for further management. Pt c/o abdominal pain on 12/10 CTA showing mid SMA with embolus. Abnormal distal small bowel loops and cecum with dilatation and pneumatosis suggesting infarcted bowel. One or two tiny foci of intrahepatic portal vein pneumatosis. Segmental infarction upper pole left kidney. Now s/p Ex lap, SMA embolectomy, 80cm SBR, abthera vac left in discontinuity (12/11) and transferred to SICU intubated. S/p second look (12/13) and most recently s/p OR for 3rd look, end-to-end anastomosis of remaining bowel, loop ileostomy and abdomen closure (12/15). Now stepped down to telemtry. LLE ischemia, AKA delayed by nutritional optimization.    Regular diet w/ ensures  PPN  Zosyn  Pain/nausea control  A flutter now s/p cardioversion, amio 100 QD, toprol XL 25 QD  LV thrombus w LLE limb ischemia  Lovenox  OOBA/IS  AKA likely outpatient  AM labs with nutrition labs  Dispo: optimize nutrition status, possible reversal of ostomy this week pending nutrition labs today WNL

## 2024-12-04 NOTE — H&P PST ADULT - PRO INTERPRETER NEED 2
- discontinue hydralazine given side effects  - Pt agreed to try switching acebutolol to Carvedilol   - Return in 2 weeks for BP check, increase carvedilol if still not controlled    English